# Patient Record
Sex: FEMALE | URBAN - METROPOLITAN AREA
[De-identification: names, ages, dates, MRNs, and addresses within clinical notes are randomized per-mention and may not be internally consistent; named-entity substitution may affect disease eponyms.]

---

## 2018-06-27 ENCOUNTER — APPOINTMENT (RX ONLY)
Dept: URBAN - METROPOLITAN AREA CLINIC 54 | Facility: CLINIC | Age: 68
Setting detail: DERMATOLOGY
End: 2018-06-27

## 2018-06-27 DIAGNOSIS — L72.3 SEBACEOUS CYST: ICD-10-CM

## 2018-06-27 PROBLEM — I10 ESSENTIAL (PRIMARY) HYPERTENSION: Status: ACTIVE | Noted: 2018-06-27

## 2018-06-27 PROCEDURE — ? INTRALESIONAL KENALOG

## 2018-06-27 PROCEDURE — ? COUNSELING

## 2018-06-27 PROCEDURE — 11900 INJECT SKIN LESIONS </W 7: CPT

## 2018-06-27 PROCEDURE — ? PRESCRIPTION

## 2018-06-27 RX ORDER — DOXYCYCLINE HYCLATE 100 MG/1
CAPSULE, GELATIN COATED ORAL BID
Qty: 60 | Refills: 0 | Status: ERX | COMMUNITY
Start: 2018-06-27

## 2018-06-27 RX ADMIN — DOXYCYCLINE HYCLATE: 100 CAPSULE, GELATIN COATED ORAL at 12:47

## 2018-06-27 ASSESSMENT — LOCATION SIMPLE DESCRIPTION DERM: LOCATION SIMPLE: RIGHT CHEEK

## 2018-06-27 ASSESSMENT — LOCATION ZONE DERM: LOCATION ZONE: FACE

## 2018-06-27 ASSESSMENT — LOCATION DETAILED DESCRIPTION DERM: LOCATION DETAILED: RIGHT CENTRAL MANDIBULAR CHEEK

## 2018-07-25 ENCOUNTER — APPOINTMENT (RX ONLY)
Dept: URBAN - METROPOLITAN AREA CLINIC 54 | Facility: CLINIC | Age: 68
Setting detail: DERMATOLOGY
End: 2018-07-25

## 2018-07-25 DIAGNOSIS — L72.3 SEBACEOUS CYST: ICD-10-CM

## 2018-07-25 PROCEDURE — 11900 INJECT SKIN LESIONS </W 7: CPT

## 2018-07-25 PROCEDURE — ? INTRALESIONAL KENALOG

## 2018-07-25 PROCEDURE — ? COUNSELING

## 2018-07-25 ASSESSMENT — LOCATION ZONE DERM: LOCATION ZONE: FACE

## 2018-07-25 ASSESSMENT — LOCATION SIMPLE DESCRIPTION DERM: LOCATION SIMPLE: RIGHT CHEEK

## 2018-07-25 ASSESSMENT — LOCATION DETAILED DESCRIPTION DERM: LOCATION DETAILED: RIGHT CENTRAL MANDIBULAR CHEEK

## 2018-08-22 ENCOUNTER — APPOINTMENT (RX ONLY)
Dept: URBAN - METROPOLITAN AREA CLINIC 54 | Facility: CLINIC | Age: 68
Setting detail: DERMATOLOGY
End: 2018-08-22

## 2018-08-22 DIAGNOSIS — L72.3 SEBACEOUS CYST: ICD-10-CM

## 2018-08-22 PROCEDURE — 10060 I&D ABSCESS SIMPLE/SINGLE: CPT

## 2018-08-22 PROCEDURE — ? INCISION AND DRAINAGE

## 2018-08-22 PROCEDURE — ? PRESCRIPTION

## 2018-08-22 RX ORDER — DOXYCYCLINE HYCLATE 100 MG/1
CAPSULE, GELATIN COATED ORAL BID
Qty: 28 | Refills: 0 | Status: ERX

## 2018-08-22 ASSESSMENT — LOCATION SIMPLE DESCRIPTION DERM: LOCATION SIMPLE: RIGHT CHEEK

## 2018-08-22 ASSESSMENT — LOCATION DETAILED DESCRIPTION DERM: LOCATION DETAILED: RIGHT CENTRAL MANDIBULAR CHEEK

## 2018-08-22 ASSESSMENT — LOCATION ZONE DERM: LOCATION ZONE: FACE

## 2018-08-22 NOTE — PROCEDURE: INCISION AND DRAINAGE
Curette Text (Optional): After the contents were expressed a curette was used to partially remove the cyst wall.
Preparation Text: The area was prepped in the usual clean fashion.
Post-Care Instructions: I reviewed with the patient in detail post-care instructions. Patient should keep wound covered and call the office should any redness, pain, swelling or worsening occur.
Include Sutures?: No
Suture Text: The incision was partially closed with
Lesion Type: Abscess
Epidermal Closure: simple interrupted
Epidermal Sutures: 4-0 Ethilon
Body Location Override (Optional - Billing Will Still Be Based On Selected Body Map Location If Applicable): right jawline
Consent was obtained and risks were reviewed including but not limited to delayed wound healing, infection, need for multiple I and D's, and pain.
Dressing: dry sterile dressing
Wound Care: Petrolatum
Size Of Lesion In Cm (Optional But May Be Required For Some Insurances): 0
Anesthesia Type: 1% lidocaine with epinephrine
Method: 11 blade
Detail Level: Detailed

## 2018-08-22 NOTE — HPI: NODULE (SMALL BUMP UNDERNEATH SKIN)
Is This A New Presentation, Or A Follow-Up?: Follow Up Nodule
Additional History: Had ILK injections twice, requesting any new treatment options?

## 2018-09-19 ENCOUNTER — APPOINTMENT (RX ONLY)
Dept: URBAN - METROPOLITAN AREA CLINIC 54 | Facility: CLINIC | Age: 68
Setting detail: DERMATOLOGY
End: 2018-09-19

## 2018-09-19 DIAGNOSIS — Z48.817 ENCOUNTER FOR SURGICAL AFTERCARE FOLLOWING SURGERY ON THE SKIN AND SUBCUTANEOUS TISSUE: ICD-10-CM

## 2018-09-19 PROCEDURE — ? POST-OP WOUND EVALUATION

## 2018-09-19 PROCEDURE — 99024 POSTOP FOLLOW-UP VISIT: CPT

## 2018-09-19 ASSESSMENT — LOCATION SIMPLE DESCRIPTION DERM: LOCATION SIMPLE: RIGHT CHEEK

## 2018-09-19 ASSESSMENT — LOCATION DETAILED DESCRIPTION DERM: LOCATION DETAILED: RIGHT CENTRAL MANDIBULAR CHEEK

## 2018-09-19 ASSESSMENT — LOCATION ZONE DERM: LOCATION ZONE: FACE

## 2018-09-19 NOTE — PROCEDURE: POST-OP WOUND EVALUATION
Add 00141 Cpt? (Important Note: In 2017 The Use Of 11514 Is Being Tracked By Cms To Determine Future Global Period Reimbursement For Global Periods): yes
Wound Diameter In Cm(Optional): 0
Detail Level: Detailed
Wound Discharge?: minimal discharge